# Patient Record
Sex: MALE | Race: NATIVE HAWAIIAN OR OTHER PACIFIC ISLANDER
[De-identification: names, ages, dates, MRNs, and addresses within clinical notes are randomized per-mention and may not be internally consistent; named-entity substitution may affect disease eponyms.]

---

## 2017-01-24 ENCOUNTER — HOSPITAL ENCOUNTER (OUTPATIENT)
Dept: HOSPITAL 39 - MRI | Age: 62
End: 2017-01-24
Attending: ORTHOPAEDIC SURGERY
Payer: COMMERCIAL

## 2017-01-24 DIAGNOSIS — M62.512: ICD-10-CM

## 2017-01-24 DIAGNOSIS — M75.102: Primary | ICD-10-CM

## 2017-01-24 DIAGNOSIS — M19.012: ICD-10-CM

## 2017-01-24 NOTE — MRI
Study: MRI of the Left Shoulder.



Indication: PAIN



Technique: Multiplanar, multi sequence MRI of the left shoulder was

obtained without intravenous contrast.



Comparison: Radiographs April 25, 2016.



Findings:



Moderate AC joint osteoarthritis with mild contouring underlying

supraspinatus muscle belly. Type 1 acromion with mild lateral

downsloping. Small volume subacromial/subdeltoid bursal fluid.



Irregular full-thickness tearing anterior 2/3 supraspinatus tendon noted

with high-grade articular side tearing of the posterior 3rd as well as

the anterior half of the infraspinatus tendon at the critical zone. In

its entirety the tearing extends over an AP length of 34 mm and a

transverse width by approximately 18 mm.  Torn tendon fibers are medially

retracted by up to 20 mm.



Subscapularis tendinosis noted with intermediate-grade

bursal/interstitial tearing of the superior leading edge. Teres minor

tendon intact. Mild atrophy supraspinatus muscle belly. No fatty

infiltration.



Long head biceps tendon slightly medially subluxed partially onto the

lesser tuberosity superiorly. Intracapsular tendinosis and attenuation

without high-grade tear or retraction.



Circumferential labral truncation/degeneration. Mild glenohumeral

osteoarthritis with a small joint effusion. No acute fracture or osseous

contusion.



Impression:



Irregular full-thickness tearing anterior 2/3 supraspinatus tendon with

high-grade articular side tearing of the remainder of the supraspinatus

tendon and anterior half infraspinatus tendon.



Subscapularis tendinosis with intermediate-grade bursal/interstitial

tearing superiorly.



Mild atrophy supraspinatus muscle belly.



Long head biceps tendinosis with minimal medial subluxation



Circumferential labral truncation/degeneration.



Mild glenohumeral joint osteoarthritis.



Moderate AC joint osteoarthritis.









Electronically signed by: Matt Delarosa MD 01/24/2017 09:50

## 2017-07-27 ENCOUNTER — HOSPITAL ENCOUNTER (OUTPATIENT)
Dept: HOSPITAL 39 - GMAB | Age: 62
Discharge: HOME | End: 2017-07-27
Attending: FAMILY MEDICINE
Payer: COMMERCIAL

## 2017-07-27 DIAGNOSIS — Z00.01: Primary | ICD-10-CM

## 2018-10-24 ENCOUNTER — HOSPITAL ENCOUNTER (OUTPATIENT)
Dept: HOSPITAL 39 - GMAE | Age: 63
End: 2018-10-24
Attending: FAMILY MEDICINE
Payer: COMMERCIAL

## 2018-10-24 DIAGNOSIS — Z00.01: Primary | ICD-10-CM

## 2019-11-04 ENCOUNTER — HOSPITAL ENCOUNTER (OUTPATIENT)
Dept: HOSPITAL 39 - GMAE | Age: 64
End: 2019-11-04
Attending: FAMILY MEDICINE
Payer: COMMERCIAL

## 2019-11-04 DIAGNOSIS — Z00.00: Primary | ICD-10-CM

## 2020-02-07 ENCOUNTER — HOSPITAL ENCOUNTER (OUTPATIENT)
Dept: HOSPITAL 39 - AMB | Age: 65
Discharge: HOME | End: 2020-02-07
Attending: SPECIALIST
Payer: COMMERCIAL

## 2020-02-07 VITALS — TEMPERATURE: 97.7 F | SYSTOLIC BLOOD PRESSURE: 154 MMHG | OXYGEN SATURATION: 96 % | DIASTOLIC BLOOD PRESSURE: 82 MMHG

## 2020-02-07 DIAGNOSIS — I10: ICD-10-CM

## 2020-02-07 DIAGNOSIS — Z79.899: ICD-10-CM

## 2020-02-07 DIAGNOSIS — Z86.010: ICD-10-CM

## 2020-02-07 DIAGNOSIS — Z12.11: Primary | ICD-10-CM

## 2020-02-07 DIAGNOSIS — K62.1: ICD-10-CM

## 2020-02-07 PROCEDURE — 00812 ANES LWR INTST SCR COLSC: CPT

## 2020-02-07 PROCEDURE — 45380 COLONOSCOPY AND BIOPSY: CPT

## 2020-02-07 NOTE — OP
DATE OF PROCEDURE:  02/07/20



PREOPERATIVE DIAGNOSIS: 

1.  History of colonic polyps.



POSTOPERATIVE DIAGNOSIS: 

1.  Single polyp in the rectum.



PROCEDURE: 

1.  Colonoscopy with polypectomy, forceps.



SURGEON:  Brad Gayle MD



PROCEDURE IN DETAIL:  In lateral position, general anesthesia was induced.  
Digital rectal exam was normal.  The colonoscope was then passed with minimal 
difficulty to the cecum as identified by the appendiceal orifice and ileocecal 
valve.  Upon careful withdrawal, good examination and good prep, no significant 
polyps were seen.  There was a small likely hyperplastic polyp at 15 cm in the 
rectum that was excised, two actually right next to each other.  These were 
sent.  Otherwise, normal exam.  Retroflexion was relatively normal with small 
hemorrhoids.  The patient tolerated the procedure and was taken to Recovery to 
be discharged.  



#[id number]

[facility]

## 2020-12-10 ENCOUNTER — HOSPITAL ENCOUNTER (OUTPATIENT)
Dept: HOSPITAL 39 - GMAE | Age: 65
End: 2020-12-10
Attending: FAMILY MEDICINE
Payer: COMMERCIAL

## 2020-12-10 DIAGNOSIS — I10: ICD-10-CM

## 2020-12-10 DIAGNOSIS — Z12.5: Primary | ICD-10-CM

## 2020-12-10 DIAGNOSIS — E78.2: ICD-10-CM
